# Patient Record
Sex: MALE
[De-identification: names, ages, dates, MRNs, and addresses within clinical notes are randomized per-mention and may not be internally consistent; named-entity substitution may affect disease eponyms.]

---

## 2018-01-12 ENCOUNTER — HOSPITAL ENCOUNTER (EMERGENCY)
Dept: HOSPITAL 92 - ERS | Age: 23
Discharge: HOME | End: 2018-01-12
Payer: COMMERCIAL

## 2018-01-12 DIAGNOSIS — J11.1: Primary | ICD-10-CM

## 2018-01-12 DIAGNOSIS — F17.210: ICD-10-CM

## 2018-01-12 PROCEDURE — 99283 EMERGENCY DEPT VISIT LOW MDM: CPT

## 2018-01-12 PROCEDURE — 87804 INFLUENZA ASSAY W/OPTIC: CPT

## 2019-10-12 ENCOUNTER — HOSPITAL ENCOUNTER (OUTPATIENT)
Dept: HOSPITAL 92 - ERS | Age: 24
Setting detail: OBSERVATION
LOS: 1 days | Discharge: HOME | End: 2019-10-13
Attending: INTERNAL MEDICINE | Admitting: INTERNAL MEDICINE
Payer: COMMERCIAL

## 2019-10-12 VITALS — BODY MASS INDEX: 30.4 KG/M2

## 2019-10-12 DIAGNOSIS — E86.0: ICD-10-CM

## 2019-10-12 DIAGNOSIS — K52.9: Primary | ICD-10-CM

## 2019-10-12 DIAGNOSIS — E87.2: ICD-10-CM

## 2019-10-12 DIAGNOSIS — A41.9: ICD-10-CM

## 2019-10-12 DIAGNOSIS — F17.210: ICD-10-CM

## 2019-10-12 LAB
ALBUMIN SERPL BCG-MCNC: 5 G/DL (ref 3.5–5)
ALP SERPL-CCNC: 83 U/L (ref 40–110)
ALT SERPL W P-5'-P-CCNC: 51 U/L (ref 8–55)
ANION GAP SERPL CALC-SCNC: 14 MMOL/L (ref 10–20)
ANISOCYTOSIS BLD QL SMEAR: (no result) (100X)
AST SERPL-CCNC: 36 U/L (ref 5–34)
BILIRUB SERPL-MCNC: 0.6 MG/DL (ref 0.2–1.2)
BUN SERPL-MCNC: 20 MG/DL (ref 8.9–20.6)
CALCIUM SERPL-MCNC: 10 MG/DL (ref 7.8–10.44)
CHLORIDE SERPL-SCNC: 104 MMOL/L (ref 98–107)
CO2 SERPL-SCNC: 26 MMOL/L (ref 22–29)
CREAT CL PREDICTED SERPL C-G-VRATE: 0 ML/MIN (ref 70–130)
GLOBULIN SER CALC-MCNC: 3.5 G/DL (ref 2.4–3.5)
GLUCOSE SERPL-MCNC: 114 MG/DL (ref 70–105)
HGB BLD-MCNC: 17 G/DL (ref 14–18)
MCH RBC QN AUTO: 28.1 PG (ref 27–31)
MCV RBC AUTO: 84.6 FL (ref 78–98)
MDIFF COMPLETE?: YES
PLATELET # BLD AUTO: 328 THOU/UL (ref 130–400)
POTASSIUM SERPL-SCNC: 5.3 MMOL/L (ref 3.5–5.1)
PROT UR STRIP.AUTO-MCNC: 30 MG/DL
RBC # BLD AUTO: 6.04 MILL/UL (ref 4.7–6.1)
RBC UR QL AUTO: (no result) HPF (ref 0–3)
SODIUM SERPL-SCNC: 139 MMOL/L (ref 136–145)
WBC # BLD AUTO: 20.8 THOU/UL (ref 4.8–10.8)
WBC UR QL AUTO: (no result) HPF (ref 0–3)

## 2019-10-12 PROCEDURE — 81003 URINALYSIS AUTO W/O SCOPE: CPT

## 2019-10-12 PROCEDURE — 83605 ASSAY OF LACTIC ACID: CPT

## 2019-10-12 PROCEDURE — 96365 THER/PROPH/DIAG IV INF INIT: CPT

## 2019-10-12 PROCEDURE — 81015 MICROSCOPIC EXAM OF URINE: CPT

## 2019-10-12 PROCEDURE — 71045 X-RAY EXAM CHEST 1 VIEW: CPT

## 2019-10-12 PROCEDURE — 87086 URINE CULTURE/COLONY COUNT: CPT

## 2019-10-12 PROCEDURE — 96361 HYDRATE IV INFUSION ADD-ON: CPT

## 2019-10-12 PROCEDURE — S0028 INJECTION, FAMOTIDINE, 20 MG: HCPCS

## 2019-10-12 PROCEDURE — 96367 TX/PROPH/DG ADDL SEQ IV INF: CPT

## 2019-10-12 PROCEDURE — 87804 INFLUENZA ASSAY W/OPTIC: CPT

## 2019-10-12 PROCEDURE — 80048 BASIC METABOLIC PNL TOTAL CA: CPT

## 2019-10-12 PROCEDURE — 96375 TX/PRO/DX INJ NEW DRUG ADDON: CPT

## 2019-10-12 PROCEDURE — G0378 HOSPITAL OBSERVATION PER HR: HCPCS

## 2019-10-12 PROCEDURE — 85025 COMPLETE CBC W/AUTO DIFF WBC: CPT

## 2019-10-12 PROCEDURE — 80053 COMPREHEN METABOLIC PANEL: CPT

## 2019-10-12 PROCEDURE — 36415 COLL VENOUS BLD VENIPUNCTURE: CPT

## 2019-10-12 PROCEDURE — 96366 THER/PROPH/DIAG IV INF ADDON: CPT

## 2019-10-12 PROCEDURE — 87040 BLOOD CULTURE FOR BACTERIA: CPT

## 2019-10-12 PROCEDURE — 96376 TX/PRO/DX INJ SAME DRUG ADON: CPT

## 2019-10-12 RX ADMIN — METRONIDAZOLE SCH MLS: 500 INJECTION, SOLUTION INTRAVENOUS at 23:56

## 2019-10-12 RX ADMIN — FAMOTIDINE SCH MG: 10 INJECTION, SOLUTION INTRAVENOUS at 20:41

## 2019-10-12 NOTE — HP
CHIEF COMPLAINT:  Vomiting and diarrhea.



HISTORY OF PRESENT ILLNESS:  Mr. Kline is a 24-year-old male with no

significant past medical history, presented to the emergency room with nausea and

vomiting that started earlier this morning.  The patient yesterday ate pizza with

pepperoni and sausage donut.  He started feeling sick this morning with vomiting and

diarrhea.  He also had a high fever.  Initial workup in the emergency room, the

patient had a WBC count of [id number].  Lactic acid elevated more than 3.  The patient

appears to be dehydrated.  The patient is febrile, tachycardic.  Septic workup done

in the ED including blood cultures, and started on IV antibiotics and IV fluids.

The patient is being admitted to hospital for further management. 



PAST MEDICAL HISTORY:  No significant past medical history.



PAST SURGICAL HISTORY:  No surgical history.



FAMILY HISTORY:  Reviewed and noncontributory.



HOME MEDICATIONS:  None.



ALLERGIES:  NO KNOWN ALLERGIES.



REVIEW OF SYSTEMS:  Review of 14 systems negative except what is mentioned in the

history of present illness. 



PHYSICAL EXAMINATION:

GENERAL:  The patient is awake, alert, in moderate distress. 

VITAL SIGNS:  Blood pressure 117/58, heart rate 135, now it is 111, temperature 101,

respiratory rate is 16. 

HEAD AND NECK:  Normocephalic and atraumatic. 

NECK:  Supple.  No JVD. 

CHEST:  Fair bilateral air entry. 

HEART:  S1 and S2.  Regular, tachycardic. 

ABDOMEN:  Soft, nontender.  Bowel sounds present. 

NEUROLOGIC:  Awake, alert, oriented x3.  Normal mood.  Mucous membranes dry.



LABORATORY DATA:  As mentioned above in history of present illness.



ASSESSMENT:  

1. Acute gastroenteritis.

2. Sepsis secondary to acute gastroenteritis.

3. Lactic acidosis.

4. Dehydration.



PLAN:  

1. Admit.

2. Septic workup including blood cultures.

3. IV antibiotics.  Continue with IV Cipro and Flagyl.

4. IV fluids.

5. DVT prophylaxis, early ambulation.

6. Expected length of stay at least 1 midnight if patient is stable, shows

significant clinical improvement and able to tolerate p.o. 







Job ID:  [id number]

## 2019-10-12 NOTE — RAD
PORTABLE CHEST ONE VIEW:

10/12/19 at 2:31 p.m.

 

HISTORY: 

Fever, abdominal pain, and vomiting. 

 

The heart size is normal. The lungs are expanded without focal areas of consolidations, pneumothorace
s or pleural effusions. 

 

IMPRESSION: 

No radiographic evidence of acute cardiopulmonary process. 

 

POS: [facility]

## 2019-10-13 VITALS — TEMPERATURE: 97.6 F | DIASTOLIC BLOOD PRESSURE: 69 MMHG | SYSTOLIC BLOOD PRESSURE: 112 MMHG

## 2019-10-13 LAB
ANION GAP SERPL CALC-SCNC: 11 MMOL/L (ref 10–20)
BASOPHILS # BLD AUTO: 0 THOU/UL (ref 0–0.2)
BASOPHILS NFR BLD AUTO: 0.4 % (ref 0–1)
BUN SERPL-MCNC: 15 MG/DL (ref 8.9–20.6)
CALCIUM SERPL-MCNC: 7.9 MG/DL (ref 7.8–10.44)
CHLORIDE SERPL-SCNC: 104 MMOL/L (ref 98–107)
CO2 SERPL-SCNC: 24 MMOL/L (ref 22–29)
CREAT CL PREDICTED SERPL C-G-VRATE: 171 ML/MIN (ref 70–130)
EOSINOPHIL # BLD AUTO: 0.2 THOU/UL (ref 0–0.7)
EOSINOPHIL NFR BLD AUTO: 1.9 % (ref 0–10)
GLUCOSE SERPL-MCNC: 95 MG/DL (ref 70–105)
HGB BLD-MCNC: 13.4 G/DL (ref 14–18)
LYMPHOCYTES # BLD: 1.2 THOU/UL (ref 1.2–3.4)
LYMPHOCYTES NFR BLD AUTO: 14.2 % (ref 21–51)
MCH RBC QN AUTO: 29.1 PG (ref 27–31)
MCV RBC AUTO: 85.6 FL (ref 78–98)
MONOCYTES # BLD AUTO: 0.9 THOU/UL (ref 0.11–0.59)
MONOCYTES NFR BLD AUTO: 10.2 % (ref 0–10)
NEUTROPHILS # BLD AUTO: 6.3 THOU/UL (ref 1.4–6.5)
NEUTROPHILS NFR BLD AUTO: 73.3 % (ref 42–75)
PLATELET # BLD AUTO: 213 THOU/UL (ref 130–400)
POTASSIUM SERPL-SCNC: 3.7 MMOL/L (ref 3.5–5.1)
RBC # BLD AUTO: 4.59 MILL/UL (ref 4.7–6.1)
SODIUM SERPL-SCNC: 135 MMOL/L (ref 136–145)
WBC # BLD AUTO: 8.6 THOU/UL (ref 4.8–10.8)

## 2019-10-13 RX ADMIN — FAMOTIDINE SCH: 10 INJECTION, SOLUTION INTRAVENOUS at 11:00

## 2019-10-13 RX ADMIN — METRONIDAZOLE SCH MLS: 500 INJECTION, SOLUTION INTRAVENOUS at 05:17

## 2019-10-13 NOTE — DIS
DATE OF ADMISSION:  10/12/2019



DATE OF DISCHARGE:  10/13/2019



FINAL DIAGNOSES:  

1. Acute gastroenteritis, resolved, most likely viral.

2. Dehydration, corrected.



HOSPITAL COURSE:  The patient was a 24-year-old  male with complaints of

vomiting, abdominal cramping and diarrhea.  Apparently, he ate some pizza with

pepperoni and sausage donut and he started feeling sick yesterday with vomiting and

diarrhea.  He also had high fever.  His white count was up to 20,000.  Lactic acid

was elevated more than 3.  The patient was dehydrated, tachycardic.  He was started

on IV antibiotics and IV fluids, Cipro and Flagyl.  He got admitted to the hospital.

 He improved very quickly.  The next day, everything subsided and his temperature is

down to 97.6, pulse is 94, respirations 18, O2 saturation 96% on room air.  His

blood pressure is 112/69.  He ate two meals without any problems.  No nausea, no

abdominal pain or diarrhea.  This was most likely viral versus reaction to food he

had yesterday.  He is discharged home in good condition. 



He needs to follow up with his primary care physician in 1 week.  He will stay on

regular diet and activities as tolerated. 



The time of discharge is less than 30 minutes.







Job ID:  [id number]

## 2020-06-14 ENCOUNTER — HOSPITAL ENCOUNTER (EMERGENCY)
Dept: HOSPITAL 92 - ERS | Age: 25
Discharge: HOME | End: 2020-06-14
Payer: COMMERCIAL

## 2020-06-14 DIAGNOSIS — F17.210: ICD-10-CM

## 2020-06-14 DIAGNOSIS — H11.421: Primary | ICD-10-CM

## 2020-06-14 PROCEDURE — 99283 EMERGENCY DEPT VISIT LOW MDM: CPT
